# Patient Record
Sex: FEMALE | RURAL
[De-identification: names, ages, dates, MRNs, and addresses within clinical notes are randomized per-mention and may not be internally consistent; named-entity substitution may affect disease eponyms.]

---
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
## 2024-02-16 ENCOUNTER — OFFICE VISIT (OUTPATIENT)
Dept: FAMILY MEDICINE CLINIC | Facility: CLINIC | Age: 19
End: 2024-02-16
Payer: MEDICAID

## 2024-02-16 VITALS
HEIGHT: 65 IN | WEIGHT: 130 LBS | HEART RATE: 94 BPM | BODY MASS INDEX: 21.66 KG/M2 | OXYGEN SATURATION: 97 % | SYSTOLIC BLOOD PRESSURE: 100 MMHG | DIASTOLIC BLOOD PRESSURE: 72 MMHG

## 2024-02-16 DIAGNOSIS — F84.0 AUTISM: Primary | ICD-10-CM

## 2024-02-16 DIAGNOSIS — F33.0 MAJOR DEPRESSIVE DISORDER, RECURRENT EPISODE, MILD DEGREE: ICD-10-CM

## 2024-02-16 DIAGNOSIS — G47.00 PERSISTENT INSOMNIA: ICD-10-CM

## 2024-02-16 DIAGNOSIS — E28.2 PCOS (POLYCYSTIC OVARIAN SYNDROME): ICD-10-CM

## 2024-02-16 DIAGNOSIS — Z30.013 ENCOUNTER FOR INITIAL PRESCRIPTION OF INJECTABLE CONTRACEPTIVE: ICD-10-CM

## 2024-02-16 DIAGNOSIS — F41.1 GAD (GENERALIZED ANXIETY DISORDER): ICD-10-CM

## 2024-02-16 LAB
B-HCG UR QL: NEGATIVE
EXPIRATION DATE: NORMAL
INTERNAL NEGATIVE CONTROL: NORMAL
INTERNAL POSITIVE CONTROL: NORMAL
Lab: NORMAL

## 2024-02-16 PROCEDURE — 81025 URINE PREGNANCY TEST: CPT | Performed by: PHYSICIAN ASSISTANT

## 2024-02-16 PROCEDURE — 1160F RVW MEDS BY RX/DR IN RCRD: CPT | Performed by: PHYSICIAN ASSISTANT

## 2024-02-16 PROCEDURE — 99204 OFFICE O/P NEW MOD 45 MIN: CPT | Performed by: PHYSICIAN ASSISTANT

## 2024-02-16 PROCEDURE — 1159F MED LIST DOCD IN RCRD: CPT | Performed by: PHYSICIAN ASSISTANT

## 2024-02-16 RX ORDER — TRAZODONE HYDROCHLORIDE 50 MG/1
50 TABLET ORAL NIGHTLY
Qty: 90 TABLET | Refills: 1 | Status: SHIPPED | OUTPATIENT
Start: 2024-02-16

## 2024-02-16 RX ORDER — BUSPIRONE HYDROCHLORIDE 15 MG/1
15 TABLET ORAL 2 TIMES DAILY
Qty: 180 TABLET | Refills: 1 | Status: SHIPPED | OUTPATIENT
Start: 2024-02-16

## 2024-02-16 RX ORDER — BUSPIRONE HYDROCHLORIDE 15 MG/1
15 TABLET ORAL 2 TIMES DAILY
COMMUNITY
End: 2024-02-16 | Stop reason: SDUPTHER

## 2024-02-16 RX ORDER — LAMOTRIGINE 25 MG/1
25 TABLET ORAL NIGHTLY
COMMUNITY
End: 2024-02-16 | Stop reason: SDUPTHER

## 2024-02-16 RX ORDER — MEDROXYPROGESTERONE ACETATE 150 MG/ML
150 INJECTION, SUSPENSION INTRAMUSCULAR
Qty: 1 ML | Refills: 4 | Status: SHIPPED | OUTPATIENT
Start: 2024-02-16

## 2024-02-16 RX ORDER — TRAZODONE HYDROCHLORIDE 50 MG/1
50 TABLET ORAL NIGHTLY
COMMUNITY
End: 2024-02-16 | Stop reason: SDUPTHER

## 2024-02-16 RX ORDER — LAMOTRIGINE 25 MG/1
50 TABLET ORAL NIGHTLY
Qty: 180 TABLET | Refills: 1 | Status: SHIPPED | OUTPATIENT
Start: 2024-02-16

## 2024-02-24 DIAGNOSIS — F33.0 MAJOR DEPRESSIVE DISORDER, RECURRENT EPISODE, MILD DEGREE: ICD-10-CM

## 2024-02-26 RX ORDER — LAMOTRIGINE 25 MG/1
50 TABLET ORAL NIGHTLY
Qty: 180 TABLET | Refills: 1 | Status: SHIPPED | OUTPATIENT
Start: 2024-02-26

## 2024-03-02 ENCOUNTER — OFFICE VISIT (OUTPATIENT)
Dept: FAMILY MEDICINE CLINIC | Facility: CLINIC | Age: 19
End: 2024-03-02
Payer: MEDICAID

## 2024-03-02 VITALS
DIASTOLIC BLOOD PRESSURE: 82 MMHG | HEART RATE: 127 BPM | TEMPERATURE: 99 F | SYSTOLIC BLOOD PRESSURE: 122 MMHG | OXYGEN SATURATION: 100 %

## 2024-03-02 DIAGNOSIS — R11.2 NAUSEA AND VOMITING, UNSPECIFIED VOMITING TYPE: ICD-10-CM

## 2024-03-02 DIAGNOSIS — H65.02 NON-RECURRENT ACUTE SEROUS OTITIS MEDIA OF LEFT EAR: ICD-10-CM

## 2024-03-02 DIAGNOSIS — J06.9 UPPER RESPIRATORY TRACT INFECTION, UNSPECIFIED TYPE: Primary | ICD-10-CM

## 2024-03-02 LAB
EXPIRATION DATE: NORMAL
FLUAV AG UPPER RESP QL IA.RAPID: NOT DETECTED
FLUBV AG UPPER RESP QL IA.RAPID: NOT DETECTED
INTERNAL CONTROL: NORMAL
Lab: NORMAL
SARS-COV-2 AG UPPER RESP QL IA.RAPID: NOT DETECTED

## 2024-03-02 PROCEDURE — 1159F MED LIST DOCD IN RCRD: CPT | Performed by: PHYSICIAN ASSISTANT

## 2024-03-02 PROCEDURE — 1160F RVW MEDS BY RX/DR IN RCRD: CPT | Performed by: PHYSICIAN ASSISTANT

## 2024-03-02 PROCEDURE — 87428 SARSCOV & INF VIR A&B AG IA: CPT | Performed by: PHYSICIAN ASSISTANT

## 2024-03-02 PROCEDURE — 99213 OFFICE O/P EST LOW 20 MIN: CPT | Performed by: PHYSICIAN ASSISTANT

## 2024-03-02 RX ORDER — AMOXICILLIN 875 MG/1
875 TABLET, COATED ORAL 2 TIMES DAILY
Qty: 20 TABLET | Refills: 0 | Status: SHIPPED | OUTPATIENT
Start: 2024-03-02 | End: 2024-03-12

## 2024-03-02 RX ORDER — MECLIZINE HYDROCHLORIDE 25 MG/1
25 TABLET ORAL 3 TIMES DAILY PRN
Qty: 30 TABLET | Refills: 0 | Status: SHIPPED | OUTPATIENT
Start: 2024-03-02

## 2024-03-02 NOTE — PROGRESS NOTES
Office Note     Name: Maribel Evans    : 2005     MRN: [id number]     Chief Complaint  Diarrhea (Fever and vomiting (2 days))    Subjective     History of Present Illness:  Maribel Evans is a 18 y.o. female with spectrum disorder accompanied by her mother who presents today for eval of congestion, nausea, vomiting, and diarrhea x 2 days. She has had Imodium, Motrin, tylenol and Phenergan for her symptoms. She has been exposed to COVID-19.  Her mother is also sick with similar symptoms.    Review of Systems:   Review of Systems   Constitutional:  Positive for appetite change, chills and fever (up to 101.2 F). Negative for fatigue.   HENT:  Positive for congestion, postnasal drip and rhinorrhea. Negative for ear pain, sinus pressure, sneezing and sore throat.    Respiratory:  Positive for cough, chest tightness and shortness of breath.    Gastrointestinal:  Positive for diarrhea, nausea and vomiting.   Musculoskeletal:  Positive for myalgias.   Neurological:  Positive for light-headedness (with head movement). Negative for headache.       Past Medical History:   Past Medical History:   Diagnosis Date    Anxiety     Depression     Sensory disorder        Past Surgical History: History reviewed. No pertinent surgical history.    Family History: History reviewed. No pertinent family history.    Social History:   Social History     Socioeconomic History    Marital status: Single   Tobacco Use    Smoking status: Never    Smokeless tobacco: Never   Vaping Use    Vaping status: Never Used   Substance and Sexual Activity    Alcohol use: Never    Drug use: Never    Sexual activity: Never       Immunizations:   There is no immunization history on file for this patient.     Medications:     Current Outpatient Medications:     busPIRone (BUSPAR) 15 MG tablet, Take 1 tablet by mouth 2 (Two) Times a Day., Disp: 180 tablet, Rfl: 1    lamoTRIgine (LaMICtal) 25 MG tablet, Take 2 tablets by mouth Every Night., Disp:  Physical Therapy Visit    Visit Type: Daily Treatment Note  Visit: 19  Referring Provider: Gil Friedman PA-C  Medical Diagnosis (from order): M25.562 - Left knee pain, unspecified chronicity  Z98.890 - S/P arthroscopy of left knee   Patient alert and oriented X3.    SUBJECTIVE                                                                                                               Patient reports that things are a little better.  Reports that more practice with lunging, noting reduction in pain with more repetition.  Squat depth is improving.  Reports no adverse response following last visit.     Continues to ice frequent, does note continued swelling.   Functional Change: See above     Pain / Symptoms  - Pain rating (out of 10): Current: 0 ; Worst: 2      OBJECTIVE                                                                                                                                       Treatment     Therapeutic Exercise  Instructed patient in performance of the following therapeutic exercises.  Appropriate skilled verbal, tactile, and visual demonstration cues were utilized to instruct this patient:  -Upright bike: Patient performed full revolutions for 5 minutes on level 6; seat at 7.  Performed for left knee ROM during subjective intake.    -Heel slides: Patient performed 10 reps x 2 set; left *Pain with flexion to extension mid-range   -Heel prop knee extension: Patient performed 60 seconds x 1 set; left   -Squat with  hip abductor isometric: Patient performed 10 reps x 2 sets with level 4 resistance superior to knees   -Anterior and lateral step downs: Patient performed 10 reps x 2 sets each at 6 inch step.  Visual (mirror) cues for neutral knee alignment.    -Trial lateral lunge: Patient performed alternating 6 reps; discontinued due to poor body mechanics   ~Attempt with TRX; continued ROM limitations   -Leg press: Patient performed 10 reps x 1 set at seat level 1 with progressive weight;  "180 tablet, Rfl: 1    medroxyPROGESTERone (DEPO-PROVERA) 150 MG/ML injection, Inject 1 mL into the appropriate muscle as directed by prescriber Every 3 (Three) Months., Disp: 1 mL, Rfl: 4    sertraline (ZOLOFT) 50 MG tablet, Take 1 tablet by mouth Daily., Disp: 90 tablet, Rfl: 1    traZODone (DESYREL) 50 MG tablet, Take 1 tablet by mouth Every Night. For sleep caution sedation, Disp: 90 tablet, Rfl: 1    amoxicillin (AMOXIL) 875 MG tablet, Take 1 tablet by mouth 2 (Two) Times a Day for 10 days., Disp: 20 tablet, Rfl: 0    meclizine (ANTIVERT) 25 MG tablet, Take 1 tablet by mouth 3 (Three) Times a Day As Needed for Dizziness., Disp: 30 tablet, Rfl: 0    Allergies:   No Known Allergies    Objective     Vital Signs  /82 (BP Location: Left arm, Patient Position: Sitting, Cuff Size: Adult)   Pulse (!) 127   Temp 99 °F (37.2 °C)   SpO2 100%   Estimated body mass index is 21.63 kg/m² as calculated from the following:    Height as of 2/16/24: 165.1 cm (65\").    Weight as of 2/16/24: 59 kg (130 lb).    Pediatric BMI = No height and weight on file for this encounter.. BMI is within normal parameters. No other follow-up for BMI required.      Physical Exam  Vitals and nursing note reviewed.   Constitutional:       General: She is not in acute distress.     Appearance: Normal appearance. She is not ill-appearing.   HENT:      Head: Normocephalic and atraumatic.      Right Ear: Tympanic membrane is not erythematous.      Left Ear: Tympanic membrane is erythematous.      Mouth/Throat:      Pharynx: No oropharyngeal exudate or posterior oropharyngeal erythema.   Cardiovascular:      Rate and Rhythm: Normal rate and regular rhythm.      Pulses: Normal pulses.      Heart sounds: Normal heart sounds.   Pulmonary:      Effort: Pulmonary effort is normal. No respiratory distress.      Breath sounds: Normal breath sounds.   Musculoskeletal:      Right lower leg: No edema.      Left lower leg: No edema.   Skin:     General: Skin " 210 lbs, 220 lbs   -Leg press: Patient performed 10 reps x 2 set at seat level 1 with progress weight; 70 lbs single left lower extremity   -curtsy lunge: Patient performed alternating 10 reps x 2 sets   -TRX single left lower extremity squat: Patient performed 10 reps x 1 set through minimal ROM   -Single leg RDL: Patient performed 10 reps x 1 set without weight  ~ progress with 16 lbs; 10 reps x 2 sets         Manual Therapy   Performed the following manual therapy techniques with patient consent:  -Patellar mobilization; superior/inferior and medial/lateral   -Patellar and quadriceps tendon mobilization; supine with towel support   -Patellar distraction with cup  ~Add glides in all planes   - Silicone cupping performed to patellar tendon with 1 cups to increase interstitial space and desensify the nociceptors and promote mobility.          Skilled input: verbal instruction/cues, tactile instruction/cues, posture correction and as detailed above    Writer verbally educated and received verbal consent for hand placement, positioning of patient, and techniques to be performed today from patient for therapist position for techniques and hand placement and palpation for techniques as described above and how they are pertinent to the patient's plan of care.  Home Exercise Program  Access Code: [email or web address]    Program Notes  Roller to left thigh muscles     Exercises  - Seated Hamstring Stretch  - 2 x daily - 7 x weekly - 1 sets - 1 reps - 30 hold  - Standing Gastroc Stretch on Step  - 2 x daily - 7 x weekly - 1 sets - 1 reps - 30 hold  - Standard Lunge  - 1 x daily - 7 x weekly - 2 sets - 10 reps  - Side Stepping with Resistance at Feet  - 1 x daily - 7 x weekly - 2 sets - 10 reps  - Forward Monster Walks  - 1 x daily - 7 x weekly - 2 sets - 10 reps  - Backward Monster Walks  - 1 x daily - 7 x weekly - 2 sets - 10 reps  - Hip Abduction with Resistance Loop  - 1 x daily - 7 x weekly - 2 sets - 10 reps  - Single Leg Balance  is warm and dry.   Neurological:      General: No focal deficit present.      Mental Status: She is alert and oriented to person, place, and time.      Coordination: Coordination normal.      Gait: Gait normal.   Psychiatric:         Mood and Affect: Mood normal.         Behavior: Behavior normal.          Results:  Recent Results (from the past 24 hour(s))   Covid-19 + Flu A&B AG, Veritor    Collection Time: 03/02/24 11:34 AM    Specimen: Swab   Result Value Ref Range    SARS Antigen Not Detected Not Detected, Presumptive Negative    Influenza A Antigen CINTHYA Not Detected Not Detected    Influenza B Antigen CINTHYA Not Detected Not Detected    Internal Control Passed Passed    Lot Number [id number]     Expiration Date 01/17/2025         Assessment and Plan     Assessment/Plan:  Diagnoses and all orders for this visit:    1. Upper respiratory tract infection, unspecified type (Primary)  Assessment & Plan:  I advised increase fluids, symptomatic treatment, and monitoring.    Orders:  -     Covid-19 + Flu A&B AG, Veritor    2. Non-recurrent acute serous otitis media of left ear  Assessment & Plan:  She has developed a left ear infection, which is likely causing some of her nausea and dizziness with head movement.  We will treat the infection as well as trying some meclizine.    Orders:  -     amoxicillin (AMOXIL) 875 MG tablet; Take 1 tablet by mouth 2 (Two) Times a Day for 10 days.  Dispense: 20 tablet; Refill: 0  -     meclizine (ANTIVERT) 25 MG tablet; Take 1 tablet by mouth 3 (Three) Times a Day As Needed for Dizziness.  Dispense: 30 tablet; Refill: 0    3. Nausea and vomiting, unspecified vomiting type  Assessment & Plan:  Her symptoms are viral, but her ear is a contributing factor.  We will try the meclizine.  I advised increased fluids and bland diet when tolerated.  I also advised that they may want to consider Pedialyte.  If she is unable to keep down fluids with new medications, then I recommend ER to get fluids.   with Clock Reach  - 1 x daily - 7 x weekly - 1 sets - 10 reps  - Supine Active Straight Leg Raise  - 1 x daily - 7 x weekly - 2 sets - 10 reps  - Seated Knee Extension Stretch with Chair  - 2 x daily - 7 x weekly - 1 sets - 1 reps - 60 hold  - Squat with Chair Touch and Resistance Loop  - 1 x daily - 7 x weekly - 2 sets - 10 reps      ASSESSMENT                                                                                                            Amilcar continues to lack full left knee extension AROM today, improvement to lacking 5 degrees.  Lack of ability to achieve full left knee extension likely contributing to continued quadriceps strength deficits.  Patient has made great progress in bilateral lower extremity activity, but continues to demonstrate limited stability and ROM with single left lower extremity focused exercises; ex: SL squat and lunge variations.  Exercise is most limited due to anterior patellar tendon discomfort.  Patient tolerated last session well, therefore will continue to assess response and progress sessions as tolerated.  Patient will continue to benefit from skilled physical therapy services to address left knee extension ROM, quadriceps strengthening and knee stabilization as tolerated.     Pain/symptoms after session (out of 10): 0  Education:   - Results of above outlined education: Verbalizes understanding, Demonstrates understanding and Needs reinforcement    PLAN                                                                                                                           Suggestions for next session as indicated: Progress per plan of care:   -Bike  -patella mobilization/patellar and quad tendon mobilization- continue cupping   -Left knee extension ROM   -progress lower extremity strengthening; quad specific   -Single limb activities   -Knee stabilization training          Therapy procedure time and total treatment time can be found documented on the Time Entry  Her mother expressed understanding.          Follow Up  Return if symptoms worsen or fail to improve.    Marcella Main PA-C  [facility]   flowsheet     No

## 2024-03-02 NOTE — ASSESSMENT & PLAN NOTE
Her symptoms are viral, but her ear is a contributing factor.  We will try the meclizine.  I advised increased fluids and bland diet when tolerated.  I also advised that they may want to consider Pedialyte.  If she is unable to keep down fluids with new medications, then I recommend ER to get fluids.  Her mother expressed understanding.

## 2024-03-02 NOTE — ASSESSMENT & PLAN NOTE
She has developed a left ear infection, which is likely causing some of her nausea and dizziness with head movement.  We will treat the infection as well as trying some meclizine.

## 2024-09-20 ENCOUNTER — OFFICE VISIT (OUTPATIENT)
Dept: FAMILY MEDICINE CLINIC | Facility: CLINIC | Age: 19
End: 2024-09-20
Payer: MEDICAID

## 2024-09-20 VITALS
SYSTOLIC BLOOD PRESSURE: 114 MMHG | RESPIRATION RATE: 18 BRPM | HEART RATE: 94 BPM | OXYGEN SATURATION: 99 % | HEIGHT: 65 IN | DIASTOLIC BLOOD PRESSURE: 80 MMHG | WEIGHT: 158 LBS | BODY MASS INDEX: 26.33 KG/M2

## 2024-09-20 DIAGNOSIS — F41.1 GAD (GENERALIZED ANXIETY DISORDER): ICD-10-CM

## 2024-09-20 DIAGNOSIS — F84.0 AUTISM: Primary | ICD-10-CM

## 2024-09-20 DIAGNOSIS — D22.9 CHANGE IN COLOR OF SKIN MOLE: ICD-10-CM

## 2024-09-20 DIAGNOSIS — F33.0 MAJOR DEPRESSIVE DISORDER, RECURRENT EPISODE, MILD DEGREE: ICD-10-CM

## 2024-09-20 DIAGNOSIS — G47.00 PERSISTENT INSOMNIA: ICD-10-CM

## 2024-09-20 PROBLEM — R47.9 SPEECH IMPEDIMENT: Status: ACTIVE | Noted: 2024-09-20

## 2024-09-20 PROCEDURE — 99214 OFFICE O/P EST MOD 30 MIN: CPT | Performed by: PHYSICIAN ASSISTANT

## 2024-09-20 PROCEDURE — 1160F RVW MEDS BY RX/DR IN RCRD: CPT | Performed by: PHYSICIAN ASSISTANT

## 2024-09-20 PROCEDURE — 1159F MED LIST DOCD IN RCRD: CPT | Performed by: PHYSICIAN ASSISTANT

## 2024-09-20 RX ORDER — LAMOTRIGINE 25 MG/1
50 TABLET ORAL NIGHTLY
Qty: 180 TABLET | Refills: 1 | Status: SHIPPED | OUTPATIENT
Start: 2024-09-20

## 2024-09-20 RX ORDER — BUSPIRONE HYDROCHLORIDE 15 MG/1
15 TABLET ORAL 2 TIMES DAILY
Qty: 180 TABLET | Refills: 1 | Status: SHIPPED | OUTPATIENT
Start: 2024-09-20

## 2024-09-20 RX ORDER — TRAZODONE HYDROCHLORIDE 50 MG/1
50 TABLET, FILM COATED ORAL NIGHTLY
Qty: 90 TABLET | Refills: 1 | Status: SHIPPED | OUTPATIENT
Start: 2024-09-20

## 2025-03-20 ENCOUNTER — OFFICE VISIT (OUTPATIENT)
Dept: FAMILY MEDICINE CLINIC | Facility: CLINIC | Age: 20
End: 2025-03-20
Payer: MEDICAID

## 2025-03-20 VITALS
HEART RATE: 96 BPM | HEIGHT: 65 IN | SYSTOLIC BLOOD PRESSURE: 120 MMHG | BODY MASS INDEX: 25.14 KG/M2 | WEIGHT: 150.9 LBS | OXYGEN SATURATION: 98 % | DIASTOLIC BLOOD PRESSURE: 82 MMHG

## 2025-03-20 DIAGNOSIS — B35.1 FUNGAL NAIL INFECTION: ICD-10-CM

## 2025-03-20 DIAGNOSIS — F33.0 MAJOR DEPRESSIVE DISORDER, RECURRENT EPISODE, MILD DEGREE: ICD-10-CM

## 2025-03-20 DIAGNOSIS — G47.00 PERSISTENT INSOMNIA: ICD-10-CM

## 2025-03-20 DIAGNOSIS — F41.1 GAD (GENERALIZED ANXIETY DISORDER): ICD-10-CM

## 2025-03-20 DIAGNOSIS — Z30.013 ENCOUNTER FOR INITIAL PRESCRIPTION OF INJECTABLE CONTRACEPTIVE: Primary | ICD-10-CM

## 2025-03-20 PROCEDURE — 81025 URINE PREGNANCY TEST: CPT | Performed by: PHYSICIAN ASSISTANT

## 2025-03-20 PROCEDURE — 99214 OFFICE O/P EST MOD 30 MIN: CPT | Performed by: PHYSICIAN ASSISTANT

## 2025-03-20 RX ORDER — BUSPIRONE HYDROCHLORIDE 15 MG/1
15 TABLET ORAL 2 TIMES DAILY
Qty: 180 TABLET | Refills: 1 | Status: SHIPPED | OUTPATIENT
Start: 2025-03-20

## 2025-03-20 RX ORDER — CICLOPIROX 80 MG/ML
SOLUTION TOPICAL NIGHTLY
Qty: 6 ML | Refills: 1 | Status: SHIPPED | OUTPATIENT
Start: 2025-03-20

## 2025-03-20 RX ORDER — LAMOTRIGINE 25 MG/1
50 TABLET ORAL NIGHTLY
Qty: 180 TABLET | Refills: 1 | Status: SHIPPED | OUTPATIENT
Start: 2025-03-20

## 2025-03-20 RX ORDER — TRAZODONE HYDROCHLORIDE 50 MG/1
50 TABLET ORAL NIGHTLY
Qty: 90 TABLET | Refills: 1 | Status: SHIPPED | OUTPATIENT
Start: 2025-03-20

## 2025-03-20 NOTE — PROGRESS NOTES
"Chief Complaint  Annual Exam    Subjective        Maribel Evans presents to [facility]  History of Present Illness    History of Present Illness  The patient is a 19-year-old female presenting with her mother who is assisting with the visit.  Patient has autism and speech impediment.      Patient has history of anxiety, depression and insomnia.  No current anxiety or panic attacks. Continues sertraline 1 tablet daily and lamotrigine at bedtime.  Sleep pattern has improved; she wakes up early around 3-4 AM rather than staying awake all night.     Menstrual cycle has improved with no abnormal bleeding or pain. She receives depo injections for period management.    Long-standing fungal nail infection now affecting her second toe.      MEDICATIONS  sertraline, lamotrigine      Objective   Vital Signs:  /82 (BP Location: Right arm, Patient Position: Sitting, Cuff Size: Adult)   Pulse 96   Ht 165 cm (64.96\")   Wt 68.4 kg (150 lb 14.4 oz)   SpO2 98%   BMI 25.14 kg/m²   Estimated body mass index is 25.14 kg/m² as calculated from the following:    Height as of this encounter: 165 cm (64.96\").    Weight as of this encounter: 68.4 kg (150 lb 14.4 oz).          Physical Exam  Vitals and nursing note reviewed.   Constitutional:       General: She is not in acute distress.     Appearance: Normal appearance.   HENT:      Head: Normocephalic.      Right Ear: Hearing normal.      Left Ear: Hearing normal.   Eyes:      Pupils: Pupils are equal, round, and reactive to light.   Cardiovascular:      Rate and Rhythm: Normal rate and regular rhythm.   Pulmonary:      Effort: Pulmonary effort is normal. No respiratory distress.   Feet:      Right foot:      Toenail Condition: Right toenails are abnormally thick. Fungal disease present.     Comments: Yellow thick great toenail, right  Skin:     General: Skin is warm and dry.      Findings: Acne present.      Comments: Acne on face with some open " comeozzie   Neurological:      Mental Status: She is alert.   Psychiatric:         Mood and Affect: Mood normal.        Result Review :                Assessment and Plan   Diagnoses and all orders for this visit:    1. Encounter for initial prescription of injectable contraceptive (Primary)  -     POC Pregnancy, Urine    2. LUKE (generalized anxiety disorder)  -     busPIRone (BUSPAR) 15 MG tablet; Take 1 tablet by mouth 2 (Two) Times a Day.  Dispense: 180 tablet; Refill: 1    3. Major depressive disorder, recurrent episode, mild degree  -     lamoTRIgine (LaMICtal) 25 MG tablet; Take 2 tablets by mouth Every Night.  Dispense: 180 tablet; Refill: 1  -     sertraline (ZOLOFT) 50 MG tablet; Take 1 tablet by mouth Daily.  Dispense: 90 tablet; Refill: 1    4. Persistent insomnia  -     traZODone (DESYREL) 50 MG tablet; Take 1 tablet by mouth Every Night. For sleep caution sedation  Dispense: 90 tablet; Refill: 1             Assessment & Plan  1. Anxiety:     Improved, no anxiety or panic attacks. Continue sertraline, lamotrigine and buspar     2. Persistent insomnia: Slight improvement.  Continue current treatment plan.    3. Menstrual management: Normal bleeding, no significant pain. Continue Depo-Provera shots.  Urine pregnancy negative this date    4. Acne: Cautioned patient against picking at areas on her face.  Recommended parent apply Neosporin or triple antibiotic ointment to open area to prevent infection.  Contact information to dermatology provided today.      6. Fungal nail infection: Prescribe topical cream. If no improvement, check liver function and consider oral tablets.      Follow Up   No follow-ups on file.  Patient was given instructions and counseling regarding her condition or for health maintenance advice. Please see specific information pulled into the AVS if appropriate.     Patient or patient representative verbalized consent for the use of Ambient Listening during the visit with  Eli  GELY Beal for chart documentation. 3/20/2025  09:42 EDT

## 2025-03-31 DIAGNOSIS — E28.2 PCOS (POLYCYSTIC OVARIAN SYNDROME): ICD-10-CM

## 2025-03-31 DIAGNOSIS — Z30.013 ENCOUNTER FOR INITIAL PRESCRIPTION OF INJECTABLE CONTRACEPTIVE: ICD-10-CM

## 2025-03-31 RX ORDER — MEDROXYPROGESTERONE ACETATE 150 MG/ML
150 INJECTION, SUSPENSION INTRAMUSCULAR
Qty: 1 ML | Refills: 4 | Status: SHIPPED | OUTPATIENT
Start: 2025-03-31

## 2025-04-16 ENCOUNTER — TELEPHONE (OUTPATIENT)
Dept: FAMILY MEDICINE CLINIC | Facility: CLINIC | Age: 20
End: 2025-04-16
Payer: MEDICAID

## 2025-04-16 DIAGNOSIS — Z20.7 SCABIES EXPOSURE: Primary | ICD-10-CM

## 2025-04-16 RX ORDER — PERMETHRIN 50 MG/G
CREAM TOPICAL
Qty: 60 G | Refills: 1 | Status: SHIPPED | OUTPATIENT
Start: 2025-04-16

## 2025-04-16 NOTE — TELEPHONE ENCOUNTER
Patient's mother called in advising that 2 of her other children were diagnosed with scabies and are on medication but the rest of the house now has them. She is asking for medication for Maribel to treat the scabies. Please advise.    Also, Ms. Rubin said her daughter has not received her depo injection and the anti fungal that they discussed at her last appt for her toe. Please advise.

## 2025-04-17 NOTE — TELEPHONE ENCOUNTER
Pt's mother advised. She states she received the cream for Maribel but not for her or Ty. Please advise.